# Patient Record
Sex: FEMALE | Race: WHITE | NOT HISPANIC OR LATINO | URBAN - METROPOLITAN AREA
[De-identification: names, ages, dates, MRNs, and addresses within clinical notes are randomized per-mention and may not be internally consistent; named-entity substitution may affect disease eponyms.]

---

## 2019-09-03 ENCOUNTER — EMERGENCY (EMERGENCY)
Facility: HOSPITAL | Age: 69
LOS: 1 days | Discharge: HOME | End: 2019-09-03
Attending: EMERGENCY MEDICINE | Admitting: EMERGENCY MEDICINE
Payer: COMMERCIAL

## 2019-09-03 VITALS
HEART RATE: 62 BPM | TEMPERATURE: 98 F | OXYGEN SATURATION: 97 % | RESPIRATION RATE: 18 BRPM | SYSTOLIC BLOOD PRESSURE: 143 MMHG | DIASTOLIC BLOOD PRESSURE: 84 MMHG

## 2019-09-03 VITALS
SYSTOLIC BLOOD PRESSURE: 133 MMHG | HEART RATE: 59 BPM | DIASTOLIC BLOOD PRESSURE: 80 MMHG | OXYGEN SATURATION: 99 % | RESPIRATION RATE: 17 BRPM | TEMPERATURE: 98 F

## 2019-09-03 LAB
ALBUMIN SERPL ELPH-MCNC: 3.6 G/DL — SIGNIFICANT CHANGE UP (ref 3.4–5)
ALP SERPL-CCNC: 87 U/L — SIGNIFICANT CHANGE UP (ref 40–120)
ALT FLD-CCNC: 20 U/L — SIGNIFICANT CHANGE UP (ref 12–42)
ANION GAP SERPL CALC-SCNC: 5 MMOL/L — LOW (ref 9–16)
ANION GAP SERPL CALC-SCNC: 9 MMOL/L — SIGNIFICANT CHANGE UP (ref 9–16)
AST SERPL-CCNC: 21 U/L — SIGNIFICANT CHANGE UP (ref 15–37)
BILIRUB SERPL-MCNC: 0.5 MG/DL — SIGNIFICANT CHANGE UP (ref 0.2–1.2)
BUN SERPL-MCNC: 20 MG/DL — SIGNIFICANT CHANGE UP (ref 7–23)
BUN SERPL-MCNC: 23 MG/DL — SIGNIFICANT CHANGE UP (ref 7–23)
CALCIUM SERPL-MCNC: 9.3 MG/DL — SIGNIFICANT CHANGE UP (ref 8.5–10.5)
CALCIUM SERPL-MCNC: 9.5 MG/DL — SIGNIFICANT CHANGE UP (ref 8.5–10.5)
CHLORIDE SERPL-SCNC: 107 MMOL/L — SIGNIFICANT CHANGE UP (ref 96–108)
CHLORIDE SERPL-SCNC: 110 MMOL/L — HIGH (ref 96–108)
CO2 SERPL-SCNC: 25 MMOL/L — SIGNIFICANT CHANGE UP (ref 22–31)
CO2 SERPL-SCNC: 29 MMOL/L — SIGNIFICANT CHANGE UP (ref 22–31)
CREAT SERPL-MCNC: 0.67 MG/DL — SIGNIFICANT CHANGE UP (ref 0.5–1.3)
CREAT SERPL-MCNC: 0.76 MG/DL — SIGNIFICANT CHANGE UP (ref 0.5–1.3)
CRP SERPL-MCNC: <0.2 MG/DL — SIGNIFICANT CHANGE UP (ref 0–0.9)
ERYTHROCYTE [SEDIMENTATION RATE] IN BLOOD: 3 MM/HR — SIGNIFICANT CHANGE UP (ref 0–20)
GLUCOSE SERPL-MCNC: 103 MG/DL — HIGH (ref 70–99)
GLUCOSE SERPL-MCNC: 89 MG/DL — SIGNIFICANT CHANGE UP (ref 70–99)
HCT VFR BLD CALC: 36.7 % — SIGNIFICANT CHANGE UP (ref 34.5–45)
HGB BLD-MCNC: 11.6 G/DL — SIGNIFICANT CHANGE UP (ref 11.5–15.5)
MCHC RBC-ENTMCNC: 30.9 PG — SIGNIFICANT CHANGE UP (ref 27–34)
MCHC RBC-ENTMCNC: 31.6 G/DL — LOW (ref 32–36)
MCV RBC AUTO: 97.6 FL — SIGNIFICANT CHANGE UP (ref 80–100)
PLATELET # BLD AUTO: 152 K/UL — SIGNIFICANT CHANGE UP (ref 150–400)
POTASSIUM SERPL-MCNC: 4.5 MMOL/L — SIGNIFICANT CHANGE UP (ref 3.5–5.3)
POTASSIUM SERPL-MCNC: 5.4 MMOL/L — HIGH (ref 3.5–5.3)
POTASSIUM SERPL-SCNC: 4.5 MMOL/L — SIGNIFICANT CHANGE UP (ref 3.5–5.3)
POTASSIUM SERPL-SCNC: 5.4 MMOL/L — HIGH (ref 3.5–5.3)
PROT SERPL-MCNC: 6.5 G/DL — SIGNIFICANT CHANGE UP (ref 6.4–8.2)
RBC # BLD: 3.76 M/UL — LOW (ref 3.8–5.2)
RBC # FLD: 14.1 % — SIGNIFICANT CHANGE UP (ref 10.3–14.5)
SODIUM SERPL-SCNC: 141 MMOL/L — SIGNIFICANT CHANGE UP (ref 132–145)
SODIUM SERPL-SCNC: 144 MMOL/L — SIGNIFICANT CHANGE UP (ref 132–145)
WBC # BLD: 3.8 K/UL — SIGNIFICANT CHANGE UP (ref 3.8–10.5)
WBC # FLD AUTO: 3.8 K/UL — SIGNIFICANT CHANGE UP (ref 3.8–10.5)

## 2019-09-03 PROCEDURE — 99284 EMERGENCY DEPT VISIT MOD MDM: CPT

## 2019-09-03 RX ORDER — SODIUM CHLORIDE 9 MG/ML
1000 INJECTION INTRAMUSCULAR; INTRAVENOUS; SUBCUTANEOUS ONCE
Refills: 0 | Status: COMPLETED | OUTPATIENT
Start: 2019-09-03 | End: 2019-09-03

## 2019-09-03 RX ORDER — OLMESARTAN MEDOXOMIL 5 MG/1
0 TABLET, FILM COATED ORAL
Qty: 0 | Refills: 0 | DISCHARGE

## 2019-09-03 RX ORDER — ATORVASTATIN CALCIUM 80 MG/1
0 TABLET, FILM COATED ORAL
Qty: 0 | Refills: 0 | DISCHARGE

## 2019-09-03 RX ORDER — LEVOTHYROXINE SODIUM 125 MCG
0 TABLET ORAL
Qty: 0 | Refills: 0 | DISCHARGE

## 2019-09-03 RX ORDER — ATENOLOL 25 MG/1
0 TABLET ORAL
Qty: 0 | Refills: 0 | DISCHARGE

## 2019-09-03 RX ORDER — LORATADINE 10 MG/1
10 TABLET ORAL ONCE
Refills: 0 | Status: COMPLETED | OUTPATIENT
Start: 2019-09-03 | End: 2019-09-03

## 2019-09-03 RX ADMIN — LORATADINE 10 MILLIGRAM(S): 10 TABLET ORAL at 10:58

## 2019-09-03 RX ADMIN — SODIUM CHLORIDE 1000 MILLILITER(S): 9 INJECTION INTRAMUSCULAR; INTRAVENOUS; SUBCUTANEOUS at 13:04

## 2019-09-03 RX ADMIN — Medication 50 MILLIGRAM(S): at 10:58

## 2019-09-03 NOTE — ED ADULT NURSE NOTE - CHIEF COMPLAINT QUOTE
Pt. arrived home from [place] Sunday night, since sunday she has noticed bilateral foot swelling and itchy rash to body which has been spreading. Denies any fever

## 2019-09-03 NOTE — ED PROVIDER NOTE - CLINICAL SUMMARY MEDICAL DECISION MAKING FREE TEXT BOX
nonspecific skin eruption- most similar to EN- no occult cause found- elev K improved after IVF- d/c home, steroids, benadryl x 5 days- fu w pcp if not improving  si/sx sivakumar return explained to px

## 2019-09-03 NOTE — ED PROVIDER NOTE - SKIN, MLM
Scattered nodular rash, 4-5 nodules on the right posterior proximal arm and 1-2 on the left arm, with 1 nodule on the dorsal surface of the left hand.

## 2019-09-03 NOTE — ED PROVIDER NOTE - NSFOLLOWUPINSTRUCTIONS_ED_ALL_ED_FT
Erythema Nodosum  Erythema nodosum is a skin condition in which patches of fat under the skin of the lower legs become inflamed. This causes painful bumps (nodules) to form.    What are the causes?  This condition may be caused by:  Infections. Strep throat (pharyngitis) is a common cause.  Certain medicines, especially birth control pills, penicillin, and sulfa medicines.  Sarcoidosis.  Pregnancy.  Certain inflammatory conditions, such as Crohn's disease.  Certain cancers.  In some cases, the cause may not be known.    What increases the risk?  This condition is more likely to develop in young adult women.    What are the signs or symptoms?  The nodules from erythema nodosum:  Look like raised bruises and are tender to the touch.  Usually appear on the front of the lower legs (shins) and may also appear on the arms or the abdomen.  Gradually change in color from pink to brown.  Leave a dark sebastian that fades away after several months.  Other symptoms besides nodules may include:  Fever.  Fatigue.  Joint pain.  How is this diagnosed?  This condition is often diagnosed based on your symptoms. You may have a physical exam, X-rays, and blood tests to help find the cause of your erythema nodosum. A skin sample may be removed (skin biopsy) to be examined by a specialist (pathologist).    How is this treated?  Treatment for this condition depends on the cause. The nodules usually go away after the underlying cause is treated, such as after medicine is used to fight infection. Treatment may also include:  NSAIDs for pain and inflammation.  Potassium iodide supplements.  Steroid medicines.  Rest.  Follow these instructions at home:  Activity     Rest and return to your normal activities as told by your health care provider. Ask your health care provider what activities are safe for you.  Avoid very intense (vigorous) exercise until your symptoms go away.  General instructions     Image   Take over-the-counter and prescription medicines only as told by your health care provider. If you were prescribed antibiotic medicine, take or apply it as told by your health care provider. Do not stop using the antibiotic even if you start to feel better  If directed, put ice on affected areas to help relieve pain or inflammation:  Put ice in a plastic bag.  Place a towel between your skin and the bag.  Apply ice 2–3 times a day. Do not apply ice for more than 20 minutes at a time.  If possible, raise (elevate) the affected area above the level of your heart when you are sitting or lying down. This may help with pain and inflammation.  Avoid scratching your skin.  To relieve itchiness, make a paste with dry oatmeal and warm water, then put the paste on itchy areas. Let the paste dry, remove it, and then apply moisturizer. You may do this 2–3 times a day, or as needed.  Keep all follow-up visits as told by your health care provider. This is important.  Contact a health care provider if you:  Have symptoms that do not get better with treatment and home care.  Have a fever that does not go away.  Get help right away if you:  Have pain that gets worse.  Have a sore throat.  Vomit more than one time.  Summary  Erythema nodosum is a skin condition that causes painful bumps (nodules) to form.  The bumps usually appear on the front of the lower legs (shins).  Avoid very intense (vigorous) exercise until your symptoms go away.  Contact a health care provider if you have a fever or if your symptoms do not improve.  This information is not intended to replace advice given to you by your health care provider. Make sure you discuss any questions you have with your health care provider.    Document Released: 01/25/2006 Document Revised: 10/23/2018 Document Reviewed: 10/23/2018  [employer] Interactive Patient Education © 2019 [employer]

## 2019-09-03 NOTE — ED PROVIDER NOTE - PATIENT PORTAL LINK FT
You can access the FollowMyHealth Patient Portal offered by [facility] by registering at the following website: [email or web address]. By joining [email or web address]’s FollowMyHealth portal, you will also be able to view your health information using other applications (apps) compatible with our system.

## 2019-09-03 NOTE — ED ADULT TRIAGE NOTE - CHIEF COMPLAINT QUOTE
Pt. arrived home from [place] Sunday night, since sunday she has noticed bilateral foot swelling and itchy rash to body which has been spreading. Denies any fever

## 2019-09-03 NOTE — ED PROVIDER NOTE - OBJECTIVE STATEMENT
68 y/o female with no significant PMHx presents to ED c/o generalized itchy rash. Patient reports she recently returned from a trip to [place] on Saturday. States she first noticed a rash/"bump" to her right arm on Saturday night after returning, and since then has noticed more rashes on the right arm and to the left arm. Denies any oral involvement of rash, throat swelling, fever, chills, decreased  eating/drinking, nausea, vomiting, pain, or hx of shingles. States she took Benadryl last night without any symptomatic relief.   + EtOH use  No exac/allev factors

## 2019-09-03 NOTE — ED PROVIDER NOTE - MUSCULOSKELETAL, MLM
Mild 1+ edema to the bilateral lower extremities. Range of motion is not limited, no muscle or joint tenderness

## 2019-09-07 DIAGNOSIS — R21 RASH AND OTHER NONSPECIFIC SKIN ERUPTION: ICD-10-CM

## 2019-09-07 DIAGNOSIS — L52 ERYTHEMA NODOSUM: ICD-10-CM

## 2020-07-27 NOTE — ED ADULT NURSE NOTE - TEMPLATE
Detail Level: Detailed Add 73691 Cpt? (Important Note: In 2017 The Use Of 21209 Is Being Tracked By Cms To Determine Future Global Period Reimbursement For Global Periods): no Rash

## 2021-01-01 NOTE — ED ADULT NURSE NOTE - NS PRO AD BILL OF RIGHTS
Yes
Carla Fagan)  Pediatrics  [address], [address]  [place] [address]  Phone: [phone]  Fax: [phone]  Follow Up Time: 1-3 days

## 2022-03-07 NOTE — ED PROVIDER NOTE - TIMING
frequent Lipitor 10 mg oral tablet: 1 tab(s) orally once a day  losartan 50 mg oral tablet: 1 tab(s) orally once a day  metFORMIN 500 mg oral tablet: 1 tab(s) orally 2 times a day  tamsulosin 0.4 mg oral capsule: 1 cap(s) orally once a day   finasteride 5 mg oral tablet: 1 tab(s) orally once a day x 30 days   Lipitor 10 mg oral tablet: 1 tab(s) orally once a day  losartan 50 mg oral tablet: 1 tab(s) orally once a day  metFORMIN 500 mg oral tablet: 1 tab(s) orally 2 times a day  polyethylene glycol 3350 oral powder for reconstitution: 17 gram(s) orally 2 times a day  tamsulosin 0.4 mg oral capsule: 2 cap(s) orally once a day (at bedtime)

## 2023-11-29 NOTE — ED PROVIDER NOTE - NS ED SCRIBE STATEMENT
Noted.   
Spoke with patient in regards to RD not being covered by medicare. She states she is okay with seeing health  in place. Writer changed appointment.   
Attending

## 2025-02-19 NOTE — ED PROVIDER NOTE - CCCP TRG CHIEF CMPLNT
Spoke with patient regarding wanting to leave, discussed leaving to soon, and new orders given per provider. Patient has decided to stay another night. Dr Cheung aware and provided directives to perfect serve Dr Burgess within the hour for further orders. .    rash